# Patient Record
Sex: FEMALE
[De-identification: names, ages, dates, MRNs, and addresses within clinical notes are randomized per-mention and may not be internally consistent; named-entity substitution may affect disease eponyms.]

---

## 2018-08-01 ENCOUNTER — HOSPITAL ENCOUNTER (INPATIENT)
Dept: HOSPITAL 72 - SDSOVERFLO | Age: 48
LOS: 1 days | Discharge: HOME | DRG: 30 | End: 2018-08-02
Payer: COMMERCIAL

## 2018-08-01 VITALS — SYSTOLIC BLOOD PRESSURE: 122 MMHG | DIASTOLIC BLOOD PRESSURE: 74 MMHG

## 2018-08-01 VITALS — SYSTOLIC BLOOD PRESSURE: 142 MMHG | DIASTOLIC BLOOD PRESSURE: 82 MMHG

## 2018-08-01 VITALS — DIASTOLIC BLOOD PRESSURE: 69 MMHG | SYSTOLIC BLOOD PRESSURE: 116 MMHG

## 2018-08-01 VITALS — DIASTOLIC BLOOD PRESSURE: 73 MMHG | SYSTOLIC BLOOD PRESSURE: 126 MMHG

## 2018-08-01 VITALS — SYSTOLIC BLOOD PRESSURE: 123 MMHG | DIASTOLIC BLOOD PRESSURE: 72 MMHG

## 2018-08-01 VITALS — SYSTOLIC BLOOD PRESSURE: 128 MMHG | DIASTOLIC BLOOD PRESSURE: 77 MMHG

## 2018-08-01 VITALS — DIASTOLIC BLOOD PRESSURE: 77 MMHG | SYSTOLIC BLOOD PRESSURE: 136 MMHG

## 2018-08-01 VITALS — SYSTOLIC BLOOD PRESSURE: 108 MMHG | DIASTOLIC BLOOD PRESSURE: 67 MMHG

## 2018-08-01 VITALS — SYSTOLIC BLOOD PRESSURE: 139 MMHG | DIASTOLIC BLOOD PRESSURE: 64 MMHG

## 2018-08-01 VITALS — SYSTOLIC BLOOD PRESSURE: 117 MMHG | DIASTOLIC BLOOD PRESSURE: 72 MMHG

## 2018-08-01 VITALS — DIASTOLIC BLOOD PRESSURE: 62 MMHG | SYSTOLIC BLOOD PRESSURE: 117 MMHG

## 2018-08-01 VITALS — SYSTOLIC BLOOD PRESSURE: 127 MMHG | DIASTOLIC BLOOD PRESSURE: 73 MMHG

## 2018-08-01 VITALS — SYSTOLIC BLOOD PRESSURE: 139 MMHG | DIASTOLIC BLOOD PRESSURE: 72 MMHG

## 2018-08-01 VITALS — DIASTOLIC BLOOD PRESSURE: 78 MMHG | SYSTOLIC BLOOD PRESSURE: 138 MMHG

## 2018-08-01 VITALS — HEIGHT: 58 IN
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
## 2018-08-01 DIAGNOSIS — M54.12: Primary | ICD-10-CM

## 2018-08-01 DIAGNOSIS — M48.02: ICD-10-CM

## 2018-08-01 PROCEDURE — 86901 BLOOD TYPING SEROLOGIC RH(D): CPT

## 2018-08-01 PROCEDURE — 81025 URINE PREGNANCY TEST: CPT

## 2018-08-01 PROCEDURE — 36415 COLL VENOUS BLD VENIPUNCTURE: CPT

## 2018-08-01 PROCEDURE — 94003 VENT MGMT INPAT SUBQ DAY: CPT

## 2018-08-01 PROCEDURE — 94150 VITAL CAPACITY TEST: CPT

## 2018-08-01 PROCEDURE — 72040 X-RAY EXAM NECK SPINE 2-3 VW: CPT

## 2018-08-01 PROCEDURE — 87081 CULTURE SCREEN ONLY: CPT

## 2018-08-01 PROCEDURE — 0RT30ZZ RESECTION OF CERVICAL VERTEBRAL DISC, OPEN APPROACH: ICD-10-PCS

## 2018-08-01 PROCEDURE — 0RG20A0 FUSION OF 2 OR MORE CERVICAL VERTEBRAL JOINTS WITH INTERBODY FUSION DEVICE, ANTERIOR APPROACH, ANTERIOR COLUMN, OPEN APPROACH: ICD-10-PCS

## 2018-08-01 PROCEDURE — 86900 BLOOD TYPING SEROLOGIC ABO: CPT

## 2018-08-01 PROCEDURE — 86850 RBC ANTIBODY SCREEN: CPT

## 2018-08-01 PROCEDURE — 76001: CPT

## 2018-08-01 PROCEDURE — 01N10ZZ RELEASE CERVICAL NERVE, OPEN APPROACH: ICD-10-PCS

## 2018-08-01 PROCEDURE — 07DR3ZZ EXTRACTION OF ILIAC BONE MARROW, PERCUTANEOUS APPROACH: ICD-10-PCS

## 2018-08-01 RX ADMIN — SODIUM CHLORIDE SCH MLS/HR: 0.9 INJECTION INTRAVENOUS at 17:06

## 2018-08-01 RX ADMIN — DEXTROSE AND SODIUM CHLORIDE SCH MLS/HR: 5; .45 INJECTION, SOLUTION INTRAVENOUS at 15:04

## 2018-08-01 RX ADMIN — MORPHINE SULFATE PRN MG: 4 INJECTION INTRAVENOUS at 17:05

## 2018-08-01 RX ADMIN — SODIUM CHLORIDE PRN MG: 9 INJECTION, SOLUTION INTRAVENOUS at 13:04

## 2018-08-01 RX ADMIN — DOCUSATE SODIUM SCH MG: 100 CAPSULE, LIQUID FILLED ORAL at 17:05

## 2018-08-01 RX ADMIN — SODIUM CHLORIDE PRN MG: 9 INJECTION, SOLUTION INTRAVENOUS at 12:27

## 2018-08-01 NOTE — IMMEDIATE POST-OP EVALUATION
Immediate Post-Op Evalulation


Immediate Post-Op Evalulation


Procedure:  ACDF C4-5, C5-6


Date of Evaluation:  Aug 1, 2018


Time of Evaluation:  12:18


IV Fluids:  1000 LR


Blood Products:  0


Estimated Blood Loss:  13


Urinary Output:  100


Blood Pressure Systolic:  127


Blood Pressure Diastolic:  73


Pulse Rate:  83


Respiratory Rate:  16


O2 Sat by Pulse Oximetry:  100


Temperature (Fahrenheit):  97.6


Pain Score (1-10):  3


Nausea:  No


Vomiting:  No


Complications


0


Patient Status:  awake, reacts, patent, extubated, none


Hydration Status:  adequate


Dru Grams Ancef IV


Given Within 1 Hr of Incision:  Yes


Time Given:  09:11











Prasanth Ibrahim MD Aug 1, 2018 10:00

## 2018-08-01 NOTE — BRIEF OPERATIVE NOTE
Immediate Post Operative Note


Operative Note


Pre-op Diagnosis:


cervical radiculopathy


Procedure:


acdf c45 and c56 with right iliac crest bone marrow aspiration


Post-op Diagnosis:  same as pre-op


Findings:  consistent w/pre-op dx studies


Surgeon:  Farzaneh


Assistant:  Rigoberto


Anesthesiologist:  Yogesh


Anesthesia:  general


Specimen:  yes


Complications:  none


Condition:  stable


Fluids:  1300cc


Estimated Blood Loss:  minimal - 13cc


Drains:  none


Implant(s) used?:  Yes











Scott Moy MD Aug 1, 2018 12:17

## 2018-08-01 NOTE — PRE-PROCEDURE NOTE/ATTESTATION
Pre-Procedure Note/Attestation


Complete Prior to Procedure


Procedure Narrative:


Acdf c4-5 and c5-6 with bone marrow aspiration





Indications for Procedure


Pre-Operative Diagnosis:


cervical radiculopathy





Attestation


I attest that I discussed the nature of the procedure; its benefits; risks and 

complications; and alternatives (and the risks and benefits of such alternatives

), prior to the procedure, with the patient (or the patient's legal 

representative).





I attest that, if there was a reasonable possibility of needing a blood 

transfusion, the patient (or the patient's legal representative) was given the 

[facility] standardized written summary, pursuant 

to the Hmuza Bastian Blood Safety Act ([place] Health and Safety Code # 1645, as 

amended).





I attest that I re-evaluated the patient just prior to the surgery and that 

there has been no change in the patient's H&P, except as documented below:











Scott Moy MD Aug 1, 2018 08:45

## 2018-08-01 NOTE — OPERATIVE NOTE - DICTATED
DATE OF OPERATION:  08/01/2018



PREOPERATIVE DIAGNOSIS:  C4-C5 and C5-C6 disk protrusion with upper

extremity radiculopathy.



POSTOPERATIVE DIAGNOSIS:  C4-C5 and C5-C6 disk protrusion with upper

extremity radiculopathy.



PROCEDURES PERFORMED:

1. Anterior cervical interbody fusion at C4-C5 and C5-C6.

2. Anterior diskectomy and foraminotomy at C4-C5 and C5-C6.

3. Placement of PEEK interbody device at C4-C5 and C5-C6.

4. Interbody fusion with local autograft, Bannock allograft, and bone

marrow aspirate concentrate.

5. Anterior cervical instrumentation from C4-C7.

6. Acquisition of bone marrow aspirate from the right iliac crest.



SURGEON:  Scott Moy M.D.



ASSISTANT:  Enrique Franklin M.D.



ANESTHESIA:  General endotracheal anesthesia.



ANESTHESIOLOGIST:  Prasanth Ibrahim M.D.



EBL:   13 mL.



FLUIDS:  1300 mL.



IV ANTIBIOTICS:  2 g of Ancef.



BACKGROUND:  This is a pleasant female, who failed nonoperative treatment

and option for above treatment was given.  Risks, alternatives, and

benefits were discussed with the patient at length.  The patient wished to

proceed.  Consent form was signed and no guarantees were given.



OPERATIVE FINDINGS:  Herniated nucleus pulposus at C4-C5 and C5-C6 with

central canal stenosis, severe bilateral neural foraminal stenosis,

spondylosis, and nerve root impingement bilaterally at C4-C5 and C5-C6.



DESCRIPTION OF OPERATION:  The patient was brought into the operating room,

supine on a stretcher.  Appropriate IV lines were placed by the

anesthesiologist.  A 2 g of Ancef was administered.  The patient was

induced and intubated without complication.  The patient was placed onto

the operating room table.  The neck was placed into neutral alignment.

The arms were tucked by the side.  All bony prominences were well padded

as well as the four extremities.  SSEP, EMGs, and neuromonitoring were

done, and remained stable throughout the case.  Preoperative fluoroscopy

revealed the planned incision to be on the right side over the C5

vertebral body.  Fluoroscopy showed the neck to be in adequate alignment.

The neck was prepped and draped in the usual sterile fashion.  At this

point, the incision was carried out with a scalpel on the anterior right

side of the neck in the crease of the neck.  Hemostasis was achieved with

bipolar cautery.  The platysma was incised in line with the skin incision.

Blunt dissection was carried out in the interval between the strap

muscles and sternocleidomastoid.  Superficial cervical fascia was

dissected caudally as well as cephalad.  Carotid pulse was palpated and

was found to be well lateral to the field of dissection.  Deep cervical

fascia was encountered.  Once deep cervical fascia was found, blunt

dissection was carried out with Kittners as well as finger dissection to

find the prevertebral space.  The longus colli was found on both sides of

the spine and subperiosteally dissected off of the spine.  Retractors were

set into place.  A spinal needle was used to identify the disk spaces at

C4-C5 and C5-C6 via lateral fluoroscopy.  Retractors were set into place.

The case in entirety was done with the use of intraoperatively sterilely

draped microscope.  At this point, attention was first diverted to the

C5-C6 level.  With a #15 scalpel, an incision was carried out in the

anterior annulus and with straight and curved curettes and pituitary

rongeurs as well as a high-speed drill, a radical diskectomy was carried

out.  Endplate cartilage was removed.  Endplate bone was well preserved

and the drilling was done to the posterior longitudinal ligament.  There

were significant disk protrusions causing central stenosis and osteophytes

laterally and protrusions causing neural foraminal stenosis for the

exiting C6 nerve root.  At this point, the uncus was removed with a

high-speed drill with a #1 Microsect curette was used to remove the PLL

and with #1 and #2 Kerrison punches, a complete decompression of the

spinal canal, spinal cord, neural foramina, and the exiting nerve root was

accomplished.  Valsalva was done at 40 mmHg.  There was no CSF leak.  An

oblique and a complete decompression was accomplished.  Now, retractors

were placed at the C4-C5 level and with the same instruments, a high-speed

drill, straight and curved curette, #1 and #2 Kerrison punches as well as

a high-speed drill, a radical diskectomy was done.  Endplate cartilage was

removed.  Endplate bone was well preserved and the drilling was done to

the level of the posterior longitudinal ligament.  A Microsect #1 curette

was used to remove the posterior longitudinal ligament and a complete

decompression of the central canal and neural foramina was accomplished.

There was significant disk protrusions causing stenosis and a complete

decompression of the neural foramina was completed.  Valsalva was done and

there was no CSF leak.  Before this was done, the Jamshidi needle in the

right iliac crest, which was also prepped and draped previously before

skin incision on the neck.  Jamshidi needle was placed in the iliac crest

and 30 mL of bone marrow was aspirated and sent for concentration for stem

cells.  Once this was completed, attention was now diverted back to the

spine and the spine case was started.  Once the a diskectomies and

foraminotomies were completed, trials from the Spinal Element System were

used and a 6 mm PEEK interbody device was chosen, was packed with Bannock

putty, bone marrow aspirate concentrate as well as local autograft, which

was harvested from the drilling of the endplates and was packed into the

PEEK interbody device.  Now, the PEEK interbody device was tamped into

place one at C5-C6 and another one at C4-C5 with excellent recreation of

disk height and lordosis at both levels.  The PEEK interbody device

measured 11 x 14 x 6 mm with 7 degrees of lordosis and was from the Spinal

Element System.  Now, a [place] plate of 26 mm in length was chosen, bent

into a lordotic shape, and was fixed to the anterior surface of the C4,

C5, and C6 vertebral bodies with variable self-drilling screws at C4.  A

14 mm screws were used bilaterally at C5 and C6.  A 12 mm screws were used

bilaterally.  Each screw had excellent purchase and sat below the locking

mechanism of the plate well and the screws were medialized appropriately

once they were put in.  At this point, the wound was copiously irrigated

with triple antibiotic solution.  This was done multiple times during the

case SSEP and EMGs remained stable during the case.  Hemostasis was

achieved.  The platysma was closed with 3-0 Vicryl suture.  The

subcuticular layer was closed with 3-0 Monocryl suture in an interrupted

fashion.  Dermabond was placed.  All sponge, needle, and instrument counts

were correct.  There were no complications in the case.  Cervical collar

was placed.  The patient was extubated, was taken to the recovery room in

stable condition, and was found to be neurovascularly intact.









  ______________________________________________

  Scott Moy M.D.





DR:  JACKSON

D:  08/01/2018 17:56

T:  08/01/2018 22:26

JOB#:  [id number]

CC:

## 2018-08-01 NOTE — DIAGNOSTIC IMAGING REPORT
Indication: Pain, post endotracheal tube placement

 

Technique: One view of the chest

 

Comparison: 5 hours earlier

 

Findings: Interim endotracheal intubation, endotracheal tube tip projecting

approximately 5 cm above the rani. There is increased bilateral interstitial and

airspace edema, left greater than right. The heart remains enlarged.

 

Impression: Satisfactory endotracheal intubation

 

Worsening interstitial and airspace edema, over 5 hours

## 2018-08-01 NOTE — ANETHESIA PREOPERATIVE EVAL
Anesthesia Pre-op PMH/ROS


General


Date of Evaluation:  Aug 1, 2018


Time of Evaluation:  08:41


Anesthesiologist:  Alexandria


ASA Score:  ASA 1


Mallampati Score


Class I : Soft palate, uvula, fauces, pillars visible


Class II: Soft palate, uvula, fauces visible


Class III: Soft palate, base of uvula visible


Class IV: Only hard plate visible


Mallampati Classification:  Class I


Surgeon:  Farzaneh


Diagnosis:  Neck Pain


Surgical Procedure:  ACDF C4-5, C5-6, Bone Marrow Aspiration


Anesthesia History:  none


Family History:  no anesthesia problems


Allergies:  


Coded Allergies:  


     No Known Allergies (Unverified , 18)


Medications:  see eMAR





Past Medical History


Gastrointestinal/Genitourinary:  Reports: other - L Kidney Donated To Father


PSxH Narrative:


L Kidney SX





Anesthesia Pre-op Phys. Exam


Physician Exam





Last Vital Signs








  Date Time  Temp Pulse Resp B/P (MAP) Pulse Ox O2 Delivery O2 Flow Rate FiO2


 


18 07:34 98.1 61 18 136/77 (96) 100   





 98.1       


 


18 07:27      Room Air  








Constitutional:  NAD


Neurologic:  CN 2-12 intact


Cardiovascular:  RRR


Respiratory:  CTA


Gastrointestinal:  S/NT/ND





Airway Exam


Mallampati Score:  Class I


MO:  full


ROM:  limited


Teeth:  intact





Anesthesia Pre-op A/P


Labs


Urine Pregnancy Test











Test


  18


07:05


 


Urine HCG, Qualitative


  Negative


(NEGATIVE)











Risk Assessment & Plan


Assessment:


ASA 1


Plan:


GA, BIS, GlideScope Go


Status Change Before Surgery:  No





Pre-Antibiotics


Dru Grams Ancef IV


Given Within 1 Hr of Incision:  Yes


Time Given:  09:11











Prasanth Ibrahim MD Aug 1, 2018 09:59

## 2018-08-02 VITALS — SYSTOLIC BLOOD PRESSURE: 111 MMHG | DIASTOLIC BLOOD PRESSURE: 70 MMHG

## 2018-08-02 VITALS — SYSTOLIC BLOOD PRESSURE: 117 MMHG | DIASTOLIC BLOOD PRESSURE: 71 MMHG

## 2018-08-02 VITALS — DIASTOLIC BLOOD PRESSURE: 64 MMHG | SYSTOLIC BLOOD PRESSURE: 102 MMHG

## 2018-08-02 VITALS — DIASTOLIC BLOOD PRESSURE: 70 MMHG | SYSTOLIC BLOOD PRESSURE: 103 MMHG

## 2018-08-02 VITALS — DIASTOLIC BLOOD PRESSURE: 70 MMHG | SYSTOLIC BLOOD PRESSURE: 109 MMHG

## 2018-08-02 RX ADMIN — MORPHINE SULFATE PRN MG: 4 INJECTION INTRAVENOUS at 10:02

## 2018-08-02 RX ADMIN — SODIUM CHLORIDE SCH MLS/HR: 0.9 INJECTION INTRAVENOUS at 00:18

## 2018-08-02 RX ADMIN — DEXTROSE AND SODIUM CHLORIDE SCH MLS/HR: 5; .45 INJECTION, SOLUTION INTRAVENOUS at 00:18

## 2018-08-02 RX ADMIN — DOCUSATE SODIUM SCH MG: 100 CAPSULE, LIQUID FILLED ORAL at 09:18

## 2018-08-02 RX ADMIN — SODIUM CHLORIDE SCH MLS/HR: 0.9 INJECTION INTRAVENOUS at 09:51

## 2018-08-02 RX ADMIN — DEXTROSE AND SODIUM CHLORIDE SCH MLS/HR: 5; .45 INJECTION, SOLUTION INTRAVENOUS at 10:35

## 2018-08-02 RX ADMIN — MORPHINE SULFATE PRN MG: 4 INJECTION INTRAVENOUS at 00:26

## 2018-08-02 RX ADMIN — MORPHINE SULFATE PRN MG: 4 INJECTION INTRAVENOUS at 06:13

## 2018-08-02 NOTE — 48 HOUR POST ANESTHESIA EVAL
Post Anesthesia Evaluation


Procedure:  ACDF C4-5, C5-6


Date of Evaluation:  Aug 2, 2018


Time of Evaluation:  07:26


Blood Pressure Systolic:  103


0:  64


Pulse Rate:  72


Respiratory Rate:  20


Temperature (Fahrenheit):  97.6


O2 Sat by Pulse Oximetry:  99


Airway:  patent


Nausea:  No


Vomiting:  No


Pain Intensity:  2


Hydration Status:  adequate


Cardiopulmonary Status:


stable


Mental Status/LOC:  patient returned to baseline


Follow-up Care/Observations:


n/a


Post-Anesthesia Complications:


none


Follow-up care needed:  ready to discharge











Norman Anderson MD Aug 2, 2018 07:27

## 2018-08-02 NOTE — HISTORY AND PHYSICAL
History of Present Illness


General


Date patient seen:  Aug 2, 2018





Present Illness


HPI


47 year old female with cervical radiculopathy admitted for acdf c45 and c56 

with right iliac crest bone marrow aspiration. Post operatively she is admitted 

for post op care.


Allergies:  


Coded Allergies:  


     No Known Allergies (Unverified , 8/1/18)





Medication History


Scheduled


No Known Medications* (NKM - No Known Medications*), 0 ., (Reported)





Patient History


Healthcare decision maker


ALESHIA HOFFMANZ -


Resuscitation status


Full Code


Advanced Directive on File








Past Medical/Surgical History


Past Medical/Surgical History:  


(1) Cervical radiculopathy





Review of Systems


All Other Systems:  negative except mentioned in HPI





Physical Exam


General Appearance:  WD/WN


Lines, tubes and drains:  peripheral


HEENT:  normocephalic, atraumatic


Neck:  non-tender, normal alignment


Respiratory/Chest:  lungs clear


Cardiovascular/Chest:  normal peripheral pulses, normal rate


Abdomen:  normal bowel sounds, non tender


Genitourinary/Rectal:  normal genital exam


Extremities:  normal range of motion





Last 24 Hour Vital Signs








  Date Time  Temp Pulse Resp B/P (MAP) Pulse Ox O2 Delivery O2 Flow Rate FiO2


 


8/2/18 12:00 97.8 77 18 109/70 (83) 96   





 97.8       


 


8/2/18 09:46      Room Air  


 


8/2/18 08:00 97.8 80 18 117/71 (86) 96   





 97.8       


 


8/2/18 07:27 207.7 72 20  99   


 


8/2/18 04:59 98.5 74 16 103/70 (81) 96   





 98.5       


 


8/2/18 00:58 98.1 81 20 102/64 (77) 100   





 98.1       


 


8/1/18 21:00      Room Air  


 


8/1/18 20:04 98.3 86 19 117/72 (87) 98   





 98.3       


 


8/1/18 16:00 97.5 83 18 108/67 (81)    





 97.5       


 


8/1/18 14:15 97.4 87 18 122/74 (90)    





 97.4       


 


8/1/18 13:45 97.4 86 18 139/64 (89)    





 97.4       


 


8/1/18 13:45      Nasal Cannula 2.0 


 


8/1/18 13:30 98.3 86 18 139/72 100 Nasal Cannula 3 





 98.3       


 


8/1/18 13:15  83 17 123/72 100 Nasal Cannula 3 


 


8/1/18 13:04  88 17 117/62 100 Nasal Cannula 3 


 


8/1/18 13:04 97.2       


 


8/1/18 12:57 97.2       


 


8/1/18 12:55  82 25 138/78 100 Nasal Cannula 3 

















Intake and Output  


 


 8/1/18 8/2/18





 19:00 07:00


 


Intake Total 2200 ml 1710 ml


 


Output Total 450 ml 2300 ml


 


Balance 1750 ml -590 ml


 


  


 


Intake Oral 400 ml 400 ml


 


IV Total 1800 ml 1310 ml


 


Output Urine Total 400 ml 2300 ml


 


Estimated Blood Loss 50 ml 


 


# Voids 1 








Height (Feet):  4


Height (Inches):  10.00


Weight (Pounds):  0


Medications





Current Medications








 Medications


  (Trade)  Dose


 Ordered  Sig/Serge


 Route


 PRN Reason  Start Time


 Stop Time Status Last Admin


Dose Admin


 


 Acetaminophen


  (Tylenol)  650 mg  Q4H  PRN


 ORAL


 headache or temp>101  8/1/18 14:30


 8/31/18 14:29   


 


 


 Acetaminophen/


 Hydrocodone Bitart


  (Norco 5/325)  1 tab  Q3H  PRN


 ORAL


 Mild Pain (Pain Scale 1-3)  8/1/18 14:30


 8/8/18 14:29   


 


 


 Acetaminophen/


 Hydrocodone Bitart


  (Norco 7.5/325)  1 tab  Q3H  PRN


 ORAL


 Moderate Pain (Pain Scale 4-6)  8/1/18 14:30


 8/8/18 14:29   


 


 


 Acetaminophen/


 Hydrocodone Bitart


  (Norco 7.5/325)  2 tab  Q3H  PRN


 ORAL


 Severe Pain (Pain Scale 7-10)  8/1/18 14:30


 8/8/18 14:29   


 


 


 Cetylpyridinium


 Chloride


  (Cepacol)  1 lozg  EVERY 6 HOURS  PRN


 JUANITA


 sore throat  8/2/18 09:00


 9/1/18 08:59   


 


 


 Dextrose/Sodium


 Chloride  1,000 ml @ 


 100 mls/hr  Q10H


 IV


   8/1/18 14:30


 8/31/18 14:29  8/2/18 10:35


 


 


 Docusate Sodium


  (Colace)  100 mg  TWICE A  DAY


 ORAL


   8/1/18 18:00


 8/31/18 17:59  8/2/18 09:18


 


 


 Morphine Sulfate


  (Morphine


 Sulfate)  2 mg  Q4H  PRN


 IV


 Mild Pain (Pain Scale 1-3)  8/1/18 14:30


 8/8/18 14:29   


 


 


 Morphine Sulfate


  (Morphine


 Sulfate)  4 mg  Q3H  PRN


 IV


 Severe Pain (Pain Scale 7-10)  8/1/18 14:30


 8/8/18 14:29  8/2/18 10:02


 


 


 Morphine Sulfate


  (Morphine


 Sulfate)  4 mg  Q4H  PRN


 IV


 Moderate Pain (Pain Scale 4-6)  8/1/18 14:30


 8/8/18 14:29   


 


 


 Naloxone HCl


  (Narcan)  0.1 mg  PRN  PRN


 IVP


 RR<12/min, pt unarousable  8/1/18 14:30


 8/31/18 14:29   


 


 


 Ondansetron HCl


  (Zofran)  4 mg  Q6H  PRN


 IVP


 Nausea & Vomiting  8/1/18 20:45


 8/31/18 20:44  8/1/18 20:50


 


 


 Temazepam


  (Restoril)  15 mg  HSPRN  PRN


 ORAL


 Insomnia  8/1/18 20:45


 8/8/18 20:44  8/1/18 20:50


 











Assessment/Plan


Problem List:  


(1) acdf c45 and c56 


(2) Cervical radiculopathy


ICD Codes:  M54.12 - Radiculopathy, cervical region


SNOMED:  [id number]


Assessment/Plan


pain management


post op care


dvt prophylaxis


symptomatic treatment.











Anuj Maciel MD Aug 2, 2018 12:48

## 2018-08-03 NOTE — DISCHARGE SUMMARY
Discharge Summary


Discharge Summary


_


DATE OF ADMISSION: 08/01/2018


DATE OF DISCHARGE: 08/02/2018


CONSULTANTS: Dr. Anuj Maciel   


BRIEF HOSPITAL COURSE:


Patient is a 47-year-old female, who was diagnosed with C4-C5 and C5-C6 disc 

protrusion with upper extremity radiculopathy, who've failed nonoperative 

treatment, was admitted and underwent ACDF on C4-C5 and C5-C6 with right iliac 

crest bone marrow aspiration.





She tolerated procedure well and postoperatively was admitted for pain 

management.  She was placed on SCDs for DVT prophylaxis.  She was encouraged 

use of incentive spirometry.  Diet was advanced.  She was seen by physical 

therapy.  Major catheter was discontinued.





She had good pain control and was ambulating well. Vitals were stable.  She was 

discharged home.  





FINAL DIAGNOSES: 


C4-C5 and C5-C6 disc protrusion with upper extremity radiculopathy


Status post ACDF on C4-C5 and C5-C6 with right iliac crest bone marrow 

aspiration


(Refer to operative report)





DISPOSITION: Patient was discharged home.








DISCHARGE INSTRUCTIONS: Follow up within a week.








I have been assigned to dictate discharge summary on this account, and I was 

not involved in the patient's management.











Nany Bello NP Aug 3, 2018 12:06

## 2018-08-13 NOTE — GENERAL PROGRESS NOTE
Progress Note


Progress Note


Date: 8-2-18





S: Patient doing well. Mild dysphagia with arm pain resolved. Mild pain in the 

cervical spine. 


collar on. 


ambulating 





avss 


a and o times 3 


dressing cdi


motor 5/5 in the ue 


lt intact 


calves soft and nt 





o: sp acdf and doing well 





p: 


dc today 


oob and pt 


fu in 7 to 10 days 


post op instructions given 


pain management











Scott Moy MD Aug 13, 2018 11:17